# Patient Record
Sex: MALE | Race: WHITE | URBAN - METROPOLITAN AREA
[De-identification: names, ages, dates, MRNs, and addresses within clinical notes are randomized per-mention and may not be internally consistent; named-entity substitution may affect disease eponyms.]

---

## 2021-04-05 ENCOUNTER — OFFICE VISIT (OUTPATIENT)
Dept: URBAN - METROPOLITAN AREA CLINIC 71 | Facility: CLINIC | Age: 86
End: 2021-04-05
Payer: MEDICARE

## 2021-04-05 DIAGNOSIS — H43.811 VITREOUS DEGENERATION, RIGHT EYE: ICD-10-CM

## 2021-04-05 PROCEDURE — 92134 CPTRZ OPH DX IMG PST SGM RTA: CPT | Performed by: OPHTHALMOLOGY

## 2021-04-05 PROCEDURE — 99203 OFFICE O/P NEW LOW 30 MIN: CPT | Performed by: OPHTHALMOLOGY

## 2021-04-05 ASSESSMENT — INTRAOCULAR PRESSURE
OD: 17
OS: 14

## 2021-04-05 NOTE — IMPRESSION/PLAN
Impression: Combined forms of age-related cataract, bilateral: H25.813. Cataracts do not require treatment unless they interfere with vision and impact one's activities of daily living, in which case cataract extraction with lens implant insertion should be performed. Cataracts occur in everyone as they age. Plan: Recommend patient to try and get an updated glasses rx since current pair is over 3year old. Call if changes in vision occur. Return annually for dilated exams.

## 2021-04-05 NOTE — IMPRESSION/PLAN
Impression: Vitreous degeneration, right eye: H43.811. OCT ordered today and reviewed. No holes or tears seen today. PVD is a normal aging change due to vitreous jelly pulling away from the retinal lining of the eye. They may accompany retinal tears, retinal holes, or retinal detachments, so a dilated retinal exam is important. Plan: Contact office if symptoms worsen, including increased floaters, flashing light, loss of vision or a black curtain blocking your field of vision.

## 2021-09-22 ENCOUNTER — OFFICE VISIT (OUTPATIENT)
Dept: URBAN - METROPOLITAN AREA CLINIC 71 | Facility: CLINIC | Age: 86
End: 2021-09-22
Payer: MEDICARE

## 2021-09-22 DIAGNOSIS — H43.813 VITREOUS DEGENERATION, BILATERAL: ICD-10-CM

## 2021-09-22 DIAGNOSIS — H52.4 PRESBYOPIA: ICD-10-CM

## 2021-09-22 DIAGNOSIS — H25.813 COMBINED FORMS OF AGE-RELATED CATARACT, BILATERAL: Primary | ICD-10-CM

## 2021-09-22 PROCEDURE — 99214 OFFICE O/P EST MOD 30 MIN: CPT | Performed by: OPTOMETRIST

## 2021-09-22 PROCEDURE — 92134 CPTRZ OPH DX IMG PST SGM RTA: CPT | Performed by: OPTOMETRIST

## 2021-09-22 ASSESSMENT — VISUAL ACUITY
OS: 20/40
OD: 20/40

## 2021-09-22 ASSESSMENT — INTRAOCULAR PRESSURE
OS: 16
OD: 18

## 2021-09-22 NOTE — IMPRESSION/PLAN
Impression: Vitreous degeneration, bilateral: H43.813. Plan: The PVD is stable, and there is no evidence of a retinal tear or detachment on dilated exam.  I reviewed the signs and symptoms of a retinal tear and detachment in detail with the patient, including worsening flashes, new floaters, and development of a shadow/curtain in the peripheral visual field. The patient was advised to call immediately with any changes to [facility] or increase in symptoms.

## 2021-09-22 NOTE — IMPRESSION/PLAN
Impression: Combined forms of age-related cataract, bilateral: H25.813. Plan: Cataracts account for the patient's complaints of blurred vision and glare OU. Discussed all risks, benefits, procedures and recovery. Patient understands changing glasses will not improve vision. Patient desires to have surgery, recommend phacoemulsification with intraocular lens.

## 2021-11-23 ENCOUNTER — PRE-OPERATIVE VISIT (OUTPATIENT)
Dept: URBAN - METROPOLITAN AREA CLINIC 71 | Facility: CLINIC | Age: 86
End: 2021-11-23
Payer: MEDICARE

## 2021-11-23 PROCEDURE — 92014 COMPRE OPH EXAM EST PT 1/>: CPT | Performed by: OPHTHALMOLOGY

## 2021-11-23 PROCEDURE — 76519 ECHO EXAM OF EYE: CPT | Performed by: OPHTHALMOLOGY

## 2021-11-23 NOTE — IMPRESSION/PLAN
Impression: Combined forms of age-related cataract, bilateral: H25.813. Plan: Discussed diagnosis in detail with patient. Surgical treatment is necessary to improve vision. Patient elects to have surgery. Surgical risks and benefits were discussed, explained and understood by patient. Lid scrubs and hygiene were explained. Pre op instructions given and understood. Post op instructions given and understood. Continue using current medication(s). PROCEED WITH [facility]  SURGERY  WITH DEXYCU AND KETOROLAC BID X 3 WKS STARTING THE DAY BEFORE SURGERY,  RETINAL CLEARANCE NEEDED PER DR. Katharine Platt, DR. DE GUZMAN  WANTS TO MAKE SURE RETINAL IS HEALTHY ENOUGH FOR MULTIFOCAL,CS VIDEO DONE

## 2022-01-07 ENCOUNTER — OFFICE VISIT (OUTPATIENT)
Dept: URBAN - METROPOLITAN AREA CLINIC 70 | Facility: CLINIC | Age: 87
End: 2022-01-07
Payer: MEDICARE

## 2022-01-07 DIAGNOSIS — H35.373 PUCKERING OF MACULA, BILATERAL: ICD-10-CM

## 2022-01-07 PROCEDURE — 99204 OFFICE O/P NEW MOD 45 MIN: CPT | Performed by: OPHTHALMOLOGY

## 2022-01-07 PROCEDURE — 92134 CPTRZ OPH DX IMG PST SGM RTA: CPT | Performed by: OPHTHALMOLOGY

## 2022-01-07 ASSESSMENT — INTRAOCULAR PRESSURE
OD: 20
OS: 16

## 2022-01-07 NOTE — IMPRESSION/PLAN
Impression: Puckering of macula, bilateral: H35.373. Plan: no obvious ERM or AMD. no obvious contra-indication to MFIOL but explained to patient that loss of foveal depression might suggest some manner of reduced contrast acuity. . cannot rule out very early ERM. Can assess after MFIOL if needed.

## 2022-01-07 NOTE — IMPRESSION/PLAN
Impression: Combined forms of age-related cataract, bilateral: H25.813. Plan: visually significant cataracts, no retinal holes, breaks, tears, or detachments.  ok to proceed with cataract surgery w [facility]

RTC Cataract surgery

## 2022-01-13 ENCOUNTER — SURGERY (OUTPATIENT)
Dept: URBAN - METROPOLITAN AREA SURGERY 44 | Facility: SURGERY | Age: 87
End: 2022-01-13
Payer: MEDICARE

## 2022-01-13 ENCOUNTER — SURGERY (OUTPATIENT)
Dept: URBAN - METROPOLITAN AREA SURGERY 45 | Facility: SURGERY | Age: 87
End: 2022-01-13
Payer: MEDICARE

## 2022-01-13 PROCEDURE — 66984 XCAPSL CTRC RMVL W/O ECP: CPT | Performed by: OPHTHALMOLOGY

## 2022-01-14 ENCOUNTER — POST-OPERATIVE VISIT (OUTPATIENT)
Dept: URBAN - METROPOLITAN AREA CLINIC 71 | Facility: CLINIC | Age: 87
End: 2022-01-14
Payer: MEDICARE

## 2022-01-14 PROCEDURE — 99024 POSTOP FOLLOW-UP VISIT: CPT | Performed by: OPHTHALMOLOGY

## 2022-01-14 ASSESSMENT — KERATOMETRY
OS: 41.75
OD: 40.63

## 2022-01-14 ASSESSMENT — INTRAOCULAR PRESSURE
OS: 17
OD: 22
OD: 20

## 2022-01-14 NOTE — IMPRESSION/PLAN
Impression: S/P Cataract Extraction by phacoemulsification with IOL placement; ORA OD - 1 Day. Encounter for surgical aftercare following surgery on a sense organ  Z48.810. Healing well. IOL well centered. IOP is stable. Plan: Patient to return as scheduled for second eye cataract surgery.

## 2022-01-25 ENCOUNTER — POST-OPERATIVE VISIT (OUTPATIENT)
Dept: URBAN - METROPOLITAN AREA CLINIC 71 | Facility: CLINIC | Age: 87
End: 2022-01-25
Payer: MEDICARE

## 2022-01-25 DIAGNOSIS — Z48.810 ENCOUNTER FOR SURGICAL AFTERCARE FOLLOWING SURGERY ON A SENSE ORGAN: Primary | ICD-10-CM

## 2022-01-25 PROCEDURE — 99024 POSTOP FOLLOW-UP VISIT: CPT | Performed by: OPHTHALMOLOGY

## 2022-01-25 ASSESSMENT — INTRAOCULAR PRESSURE
OS: 19
OD: 16

## 2022-01-25 NOTE — IMPRESSION/PLAN
Impression: S/P Cataract Extraction by phacoemulsification with IOL placement; ORA OD - 12 Days. Encounter for surgical aftercare following surgery on a sense organ  Z48.810. Healing well. IOL is well centered. IOP is stable. Vision is improving. Plan: Patient is cleared to proceed as scheduled for second eye cataract surgery.

## 2022-01-27 ENCOUNTER — SURGERY (OUTPATIENT)
Dept: URBAN - METROPOLITAN AREA SURGERY 44 | Facility: SURGERY | Age: 87
End: 2022-01-27
Payer: MEDICARE

## 2022-01-27 ENCOUNTER — SURGERY (OUTPATIENT)
Dept: URBAN - METROPOLITAN AREA SURGERY 45 | Facility: SURGERY | Age: 87
End: 2022-01-27
Payer: MEDICARE

## 2022-01-27 PROCEDURE — 66984 XCAPSL CTRC RMVL W/O ECP: CPT | Performed by: OPHTHALMOLOGY

## 2022-01-28 ENCOUNTER — POST-OPERATIVE VISIT (OUTPATIENT)
Dept: URBAN - METROPOLITAN AREA CLINIC 71 | Facility: CLINIC | Age: 87
End: 2022-01-28
Payer: MEDICARE

## 2022-01-28 PROCEDURE — 99024 POSTOP FOLLOW-UP VISIT: CPT | Performed by: OPHTHALMOLOGY

## 2022-01-28 ASSESSMENT — INTRAOCULAR PRESSURE
OS: 20
OS: 22
OD: 17

## 2022-01-28 NOTE — IMPRESSION/PLAN
Impression: S/P Cataract Extraction by phacoemulsification with IOL placement; ORA OS - 1 Day. Presence of intraocular lens  Z96.1. Healing well. IOL well centered. IOP is stable. Plan: Patient to return as scheduled for 1 week PO appt.

## 2022-03-01 ENCOUNTER — POST-OPERATIVE VISIT (OUTPATIENT)
Dept: URBAN - METROPOLITAN AREA CLINIC 71 | Facility: CLINIC | Age: 87
End: 2022-03-01
Payer: MEDICARE

## 2022-03-01 DIAGNOSIS — Z96.1 PRESENCE OF INTRAOCULAR LENS: Primary | ICD-10-CM

## 2022-03-01 PROCEDURE — 99024 POSTOP FOLLOW-UP VISIT: CPT

## 2022-03-01 ASSESSMENT — VISUAL ACUITY
OS: 20/20
OD: 20/20

## 2022-03-01 ASSESSMENT — INTRAOCULAR PRESSURE
OS: 12
OD: 17

## 2022-03-01 NOTE — IMPRESSION/PLAN
Impression: S/P Cataract Extraction by phacoemulsification with IOL placement; ORA OS - 33 Days. Presence of intraocular lens  Z96.1. Plan: Discussed healing well and excessive tearing is due to dry eye. Continue keeping eye well lubricated 2-3x a day. Dispensed new glasses Rx today and advised patient of adaptation period. Patient to call if any issues with new glasses.

## 2022-08-22 ENCOUNTER — OFFICE VISIT (OUTPATIENT)
Dept: URBAN - METROPOLITAN AREA CLINIC 71 | Facility: CLINIC | Age: 87
End: 2022-08-22
Payer: MEDICARE

## 2022-08-22 DIAGNOSIS — Z96.1 PRESENCE OF INTRAOCULAR LENS: ICD-10-CM

## 2022-08-22 DIAGNOSIS — H04.123 DRY EYE SYNDROME OF BILATERAL LACRIMAL GLANDS: Primary | ICD-10-CM

## 2022-08-22 DIAGNOSIS — H43.813 VITREOUS DEGENERATION, BILATERAL: ICD-10-CM

## 2022-08-22 DIAGNOSIS — H35.033 HYPERTENSIVE RETINOPATHY, BILATERAL: ICD-10-CM

## 2022-08-22 PROCEDURE — 99214 OFFICE O/P EST MOD 30 MIN: CPT

## 2022-08-22 PROCEDURE — 92134 CPTRZ OPH DX IMG PST SGM RTA: CPT

## 2022-08-22 ASSESSMENT — INTRAOCULAR PRESSURE
OD: 16
OS: 15

## 2022-08-22 NOTE — IMPRESSION/PLAN
Impression: Dry eye syndrome of bilateral lacrimal glands: H04.123. Plan: Educated pt on dry eye and recommended treatment. Recommend OTC artificial tear use 2-4x daily w/ emphasis on QAM and QHS doses. Discussed possible improvement with thicker gel or ointment QHS. Recommend warm compress w/ lid massage. If symptoms continue/worsen will consider prescription drug therapy.

## 2023-08-24 ENCOUNTER — OFFICE VISIT (OUTPATIENT)
Dept: URBAN - METROPOLITAN AREA CLINIC 71 | Facility: CLINIC | Age: 88
End: 2023-08-24
Payer: MEDICARE

## 2023-08-24 DIAGNOSIS — H43.813 VITREOUS DEGENERATION, BILATERAL: Primary | ICD-10-CM

## 2023-08-24 DIAGNOSIS — Z96.1 PRESENCE OF INTRAOCULAR LENS: ICD-10-CM

## 2023-08-24 DIAGNOSIS — H26.493 OTHER SECONDARY CATARACT, BILATERAL: ICD-10-CM

## 2023-08-24 PROCEDURE — 99213 OFFICE O/P EST LOW 20 MIN: CPT

## 2023-08-24 ASSESSMENT — INTRAOCULAR PRESSURE
OD: 16
OS: 15

## 2024-08-30 ENCOUNTER — OFFICE VISIT (OUTPATIENT)
Dept: URBAN - METROPOLITAN AREA CLINIC 71 | Facility: CLINIC | Age: 89
End: 2024-08-30
Payer: MEDICARE

## 2024-08-30 DIAGNOSIS — H43.813 VITREOUS DEGENERATION, BILATERAL: Primary | ICD-10-CM

## 2024-08-30 DIAGNOSIS — H04.123 DRY EYE SYNDROME OF BILATERAL LACRIMAL GLANDS: ICD-10-CM

## 2024-08-30 DIAGNOSIS — Z96.1 PRESENCE OF INTRAOCULAR LENS: ICD-10-CM

## 2024-08-30 PROCEDURE — 99213 OFFICE O/P EST LOW 20 MIN: CPT

## 2024-08-30 ASSESSMENT — INTRAOCULAR PRESSURE
OS: 12
OD: 14

## 2025-08-28 ENCOUNTER — OFFICE VISIT (OUTPATIENT)
Dept: URBAN - METROPOLITAN AREA CLINIC 71 | Facility: CLINIC | Age: OVER 89
End: 2025-08-28
Payer: MEDICARE

## 2025-08-28 DIAGNOSIS — H35.033 HYPERTENSIVE RETINOPATHY, BILATERAL: ICD-10-CM

## 2025-08-28 DIAGNOSIS — Z96.1 PRESENCE OF INTRAOCULAR LENS: ICD-10-CM

## 2025-08-28 DIAGNOSIS — H04.123 DRY EYE SYNDROME OF BILATERAL LACRIMAL GLANDS: ICD-10-CM

## 2025-08-28 DIAGNOSIS — H43.813 VITREOUS DEGENERATION, BILATERAL: Primary | ICD-10-CM

## 2025-08-28 PROCEDURE — 99213 OFFICE O/P EST LOW 20 MIN: CPT

## 2025-08-28 ASSESSMENT — INTRAOCULAR PRESSURE
OS: 11
OD: 11